# Patient Record
Sex: FEMALE | Race: WHITE | NOT HISPANIC OR LATINO | Employment: UNEMPLOYED | URBAN - NONMETROPOLITAN AREA
[De-identification: names, ages, dates, MRNs, and addresses within clinical notes are randomized per-mention and may not be internally consistent; named-entity substitution may affect disease eponyms.]

---

## 2020-07-22 LAB
EXTERNAL HIV CONFIRMATION: NORMAL
EXTERNAL HIV SCREEN: NORMAL

## 2024-11-01 ENCOUNTER — OFFICE VISIT (OUTPATIENT)
Dept: URGENT CARE | Facility: CLINIC | Age: 59
End: 2024-11-01
Payer: MEDICARE

## 2024-11-01 VITALS
SYSTOLIC BLOOD PRESSURE: 140 MMHG | WEIGHT: 155 LBS | HEART RATE: 82 BPM | OXYGEN SATURATION: 95 % | BODY MASS INDEX: 28.52 KG/M2 | DIASTOLIC BLOOD PRESSURE: 80 MMHG | HEIGHT: 62 IN | RESPIRATION RATE: 18 BRPM | TEMPERATURE: 99.8 F

## 2024-11-01 DIAGNOSIS — R39.9 UTI SYMPTOMS: Primary | ICD-10-CM

## 2024-11-01 DIAGNOSIS — R35.0 FREQUENCY OF MICTURITION: ICD-10-CM

## 2024-11-01 DIAGNOSIS — R11.2 NAUSEA VOMITING AND DIARRHEA: ICD-10-CM

## 2024-11-01 DIAGNOSIS — R51.9 ACUTE NONINTRACTABLE HEADACHE, UNSPECIFIED HEADACHE TYPE: ICD-10-CM

## 2024-11-01 DIAGNOSIS — R19.7 NAUSEA VOMITING AND DIARRHEA: ICD-10-CM

## 2024-11-01 PROCEDURE — G0463 HOSPITAL OUTPT CLINIC VISIT: HCPCS

## 2024-11-01 PROCEDURE — 96372 THER/PROPH/DIAG INJ SC/IM: CPT

## 2024-11-01 PROCEDURE — 87086 URINE CULTURE/COLONY COUNT: CPT

## 2024-11-01 PROCEDURE — 99213 OFFICE O/P EST LOW 20 MIN: CPT

## 2024-11-01 RX ORDER — GABAPENTIN 300 MG/1
300 CAPSULE ORAL 4 TIMES DAILY
COMMUNITY

## 2024-11-01 RX ORDER — CEPHALEXIN 500 MG/1
500 CAPSULE ORAL EVERY 12 HOURS SCHEDULED
Qty: 14 CAPSULE | Refills: 0 | Status: SHIPPED | OUTPATIENT
Start: 2024-11-01 | End: 2024-11-08

## 2024-11-01 RX ORDER — PROCHLORPERAZINE 25 MG
25 SUPPOSITORY, RECTAL RECTAL EVERY 12 HOURS PRN
Qty: 12 SUPPOSITORY | Refills: 0 | Status: SHIPPED | OUTPATIENT
Start: 2024-11-01

## 2024-11-01 RX ORDER — LISINOPRIL 5 MG/1
5 TABLET ORAL DAILY
COMMUNITY

## 2024-11-01 RX ORDER — SUVOREXANT 20 MG/1
1 TABLET, FILM COATED ORAL
COMMUNITY
Start: 2024-10-07

## 2024-11-01 RX ORDER — RIZATRIPTAN BENZOATE 10 MG/1
1 TABLET ORAL DAILY
COMMUNITY
Start: 2024-09-27

## 2024-11-01 RX ORDER — MONTELUKAST SODIUM 10 MG/1
10 TABLET ORAL
COMMUNITY

## 2024-11-01 RX ORDER — CLONAZEPAM 1 MG/1
1 TABLET ORAL 3 TIMES DAILY PRN
COMMUNITY

## 2024-11-01 RX ORDER — KETOROLAC TROMETHAMINE 30 MG/ML
30 INJECTION, SOLUTION INTRAMUSCULAR; INTRAVENOUS ONCE
Status: COMPLETED | OUTPATIENT
Start: 2024-11-01 | End: 2024-11-01

## 2024-11-01 RX ADMIN — KETOROLAC TROMETHAMINE 30 MG: 30 INJECTION, SOLUTION INTRAMUSCULAR; INTRAVENOUS at 19:22

## 2024-11-01 NOTE — PATIENT INSTRUCTIONS
Take Keflex as prescribed  Use suppository as needed for nausea and vomiting  Wait at least 6 hours before taking any other OTC medications for headache  Plenty of fluids and rest  Take Multivitamin or Magnesium 400 mg and Vitamin B2 400 mg  Can take tylenol/ibuprofen/excedrin migraine for headache  Avoid screens and bright lights   Go to ER if worsening headache, slurred speech, fainting, vision changes, or other worrisome symptoms   Follow up with PCP in 3-5 days.  Proceed to  ER if symptoms worsen.    If tests are performed, our office will contact you with results only if changes need to made to the care plan discussed with you at the visit. You can review your full results on [facility].

## 2024-11-01 NOTE — PROGRESS NOTES
[facility]        NAME: Zaida Almendarez is a 59 y.o. female  : 1965    MRN: [id number]  DATE: 2024  TIME: 7:29 PM    Assessment and Plan   UTI symptoms [R39.9]  1. UTI symptoms  cephalexin (KEFLEX) 500 mg capsule      2. Frequency of micturition  Urine culture    Urine culture    CANCELED: POCT urine HCG      3. Nausea vomiting and diarrhea  prochlorperazine (COMPAZINE) 25 mg suppository      4. Acute nonintractable headache, unspecified headache type  ketorolac (TORADOL) injection 30 mg        Patient states that IM Toradol sometimes helps breaks her migraines so we will do in office.  Patient stated she took Azo prior to coming this morning so unable to do UA.  Will prophylactically treat and send for urine culture.  For nausea and vomiting patient requested suppository medication.  Went over signs and symptoms of when to proceed ER.  Gave information for family practice to follow-up with.  Patient verbalized understanding.    Patient Instructions     Take Keflex as prescribed  Use suppository as needed for nausea and vomiting  Wait at least 6 hours before taking any other OTC medications for headache  Plenty of fluids and rest  Take Multivitamin or Magnesium 400 mg and Vitamin B2 400 mg  Can take tylenol/ibuprofen/excedrin migraine for headache  Avoid screens and bright lights   Go to ER if worsening headache, slurred speech, fainting, vision changes, or other worrisome symptoms   Follow up with PCP in 3-5 days.  Proceed to  ER if symptoms worsen.    If tests are performed, our office will contact you with results only if changes need to made to the care plan discussed with you at the visit. [facility]    Chief Complaint     Chief Complaint   Patient presents with    Possible UTI     C/O Frequency and burning upon urination associated with frequency and right flank pain which radiates forward into abdomen. Onset 3 weeks ago.     Migraine     " C/o head pain, over right side of head and also over frontal area of skull. States \"It is nonstop\" for 2 months. Takes Maxalt for same but  not working.         History of Present Illness       Patient is presenting for frequency and burning with urination x 3 weeks.  She also endorses some right flank pain that radiates to her abdomen.  She endorses nausea and a couple episodes of vomiting and diarrhea.  Denies any fevers.    Patient is also presenting for headache over the right side of her head continuously x 2 months.  She stated that she does normally take Maxalt for migraines.  She denies any dizziness, lightheadedness, vision changes, photophobia, or photophobia.    Migraine  Pertinent negatives include no abdominal pain, coughing, diarrhea, dizziness, fever, nausea, photophobia or vomiting.   Urinary Tract Infection   This is a new problem. Episode onset: 3 weeks. Associated symptoms include flank pain (R side) and frequency. Pertinent negatives include no chills, hematuria, nausea, urgency or vomiting.       Review of Systems   Review of Systems   Constitutional:  Negative for activity change, appetite change, chills and fever.   Eyes:  Negative for photophobia and visual disturbance.   Respiratory:  Negative for cough, shortness of breath and wheezing.    Cardiovascular:  Negative for chest pain.   Gastrointestinal:  Negative for abdominal pain, constipation, diarrhea, nausea and vomiting.   Genitourinary:  Positive for dysuria, flank pain (R side) and frequency. Negative for difficulty urinating, hematuria and urgency.   Neurological:  Positive for headaches. Negative for dizziness and light-headedness.         Current Medications       Current Outpatient Medications:     Belsomra 20 MG TABS, Take 1 tablet by mouth daily at bedtime, Disp: , Rfl:     cephalexin (KEFLEX) 500 mg capsule, Take 1 capsule (500 mg total) by mouth every 12 (twelve) hours for 7 days, Disp: 14 capsule, Rfl: 0    Cetirizine HCl 10 " "MG CAPS, Take 5 mg by mouth in the morning, Disp: , Rfl:     clonazePAM (KlonoPIN) 1 mg tablet, Take 1 mg by mouth 3 (three) times a day as needed for anxiety, Disp: , Rfl:     gabapentin (NEURONTIN) 300 mg capsule, Take 300 mg by mouth 4 (four) times a day, Disp: , Rfl:     lisinopril (ZESTRIL) 5 mg tablet, Take 5 mg by mouth daily, Disp: , Rfl:     montelukast (SINGULAIR) 10 mg tablet, Take 10 mg by mouth daily at bedtime, Disp: , Rfl:     prochlorperazine (COMPAZINE) 25 mg suppository, Insert 1 suppository (25 mg total) into the rectum every 12 (twelve) hours as needed for nausea or vomiting, Disp: 12 suppository, Rfl: 0    rizatriptan (MAXALT) 10 mg tablet, Take 1 tablet by mouth in the morning, Disp: , Rfl:   No current facility-administered medications for this visit.    Current Allergies     Allergies as of 11/01/2024 - Reviewed 11/01/2024   Allergen Reaction Noted    Aspirin GI Intolerance 10/16/2017    Celecoxib Other (See Comments) 11/09/2017    Codeine Itching 06/11/2013    Morphine Itching 10/16/2017    Nsaids GI Intolerance 06/11/2013    Rofecoxib Other (See Comments) 05/29/2020            The following portions of the patient's history were reviewed and updated as appropriate: allergies, current medications, past family history, past medical history, past social history, past surgical history and problem list.     Past Medical History:   Diagnosis Date    ADHD (attention deficit hyperactivity disorder)     Allergic     History of lumbar laminectomy     Migraine     Neuropathy     PTSD (post-traumatic stress disorder)        Past Surgical History:   Procedure Laterality Date    BACK SURGERY      FRACTURE SURGERY      SINUS SURGERY         No family history on file.      Medications have been verified.        Objective   /80   Pulse 82   Temp 99.8 °F (37.7 °C)   Resp 18   Ht 5' 2\" (1.575 m)   Wt 70.3 kg (155 lb)   SpO2 95%   BMI 28.35 kg/m²        Physical Exam     Physical " Exam  Constitutional:       General: She is not in acute distress.     Appearance: Normal appearance. She is not ill-appearing.   HENT:      Head: Normocephalic.   Eyes:      Extraocular Movements: Extraocular movements intact.      Conjunctiva/sclera: Conjunctivae normal.      Pupils: Pupils are equal, round, and reactive to light.   Cardiovascular:      Rate and Rhythm: Normal rate and regular rhythm.      Pulses: Normal pulses.      Heart sounds: Normal heart sounds.   Pulmonary:      Effort: Pulmonary effort is normal.      Breath sounds: Normal breath sounds.   Abdominal:      General: Abdomen is flat. Bowel sounds are normal. There is no distension.      Palpations: Abdomen is soft.      Tenderness: There is no abdominal tenderness. There is no right CVA tenderness, left CVA tenderness, guarding or rebound.   Lymphadenopathy:      Cervical: No cervical adenopathy.   Skin:     General: Skin is warm and dry.   Neurological:      General: No focal deficit present.      Mental Status: She is alert and oriented to person, place, and time. Mental status is at baseline.   Psychiatric:         Mood and Affect: Mood normal.         Behavior: Behavior normal.         Thought Content: Thought content normal.         Judgment: Judgment normal.

## 2024-11-03 LAB — BACTERIA UR CULT: NORMAL

## 2024-12-02 ENCOUNTER — OFFICE VISIT (OUTPATIENT)
Dept: URGENT CARE | Facility: CLINIC | Age: 59
End: 2024-12-02
Payer: COMMERCIAL

## 2024-12-02 VITALS
HEART RATE: 110 BPM | SYSTOLIC BLOOD PRESSURE: 184 MMHG | RESPIRATION RATE: 16 BRPM | TEMPERATURE: 99.2 F | DIASTOLIC BLOOD PRESSURE: 82 MMHG | OXYGEN SATURATION: 98 %

## 2024-12-02 DIAGNOSIS — R11.0 NAUSEA: ICD-10-CM

## 2024-12-02 DIAGNOSIS — T78.40XA ALLERGY, INITIAL ENCOUNTER: Primary | ICD-10-CM

## 2024-12-02 DIAGNOSIS — R52 ACUTE GENERALIZED BODY PAIN: ICD-10-CM

## 2024-12-02 PROCEDURE — 96372 THER/PROPH/DIAG INJ SC/IM: CPT

## 2024-12-02 PROCEDURE — 99213 OFFICE O/P EST LOW 20 MIN: CPT

## 2024-12-02 RX ORDER — METHYLPREDNISOLONE SODIUM SUCCINATE 125 MG/2ML
125 INJECTION, POWDER, LYOPHILIZED, FOR SOLUTION INTRAMUSCULAR; INTRAVENOUS ONCE
Status: COMPLETED | OUTPATIENT
Start: 2024-12-02 | End: 2024-12-02

## 2024-12-02 RX ORDER — HYDROXYZINE HYDROCHLORIDE 50 MG/1
50 TABLET, FILM COATED ORAL 3 TIMES DAILY PRN
Qty: 30 TABLET | Refills: 0 | Status: SHIPPED | OUTPATIENT
Start: 2024-12-02

## 2024-12-02 RX ORDER — PROCHLORPERAZINE 25 MG
25 SUPPOSITORY, RECTAL RECTAL EVERY 12 HOURS PRN
Qty: 12 SUPPOSITORY | Refills: 0 | Status: SHIPPED | OUTPATIENT
Start: 2024-12-02

## 2024-12-02 RX ORDER — ALBUTEROL SULFATE 90 UG/1
2 INHALANT RESPIRATORY (INHALATION) EVERY 6 HOURS PRN
Qty: 8.5 G | Refills: 0 | Status: SHIPPED | OUTPATIENT
Start: 2024-12-02

## 2024-12-02 RX ADMIN — METHYLPREDNISOLONE SODIUM SUCCINATE 125 MG: 125 INJECTION, POWDER, LYOPHILIZED, FOR SOLUTION INTRAMUSCULAR; INTRAVENOUS at 18:00

## 2024-12-02 NOTE — PATIENT INSTRUCTIONS
Use inhaler for shortness of breath or wheezing  Continue OTC and previously prescribed medication for pain   Follow up with PCP for ongoing symptoms   Follow up with pain management for continued body pain   Follow up with PCP in 3-5 days.  Proceed to  ER if symptoms worsen.    If tests are performed, our office will contact you with results only if changes need to made to the care plan discussed with you at the visit. You can review your full results on [facility].

## 2024-12-02 NOTE — PROGRESS NOTES
"  [facility]        NAME: Zaida Almendarez is a 59 y.o. female  : 1965    MRN: [id number]  DATE: 2024  TIME: 8:26 PM    Assessment and Plan   Allergy, initial encounter [T78.40XA]  1. Allergy, initial encounter  methylPREDNISolone sodium succinate (Solu-MEDROL) injection 125 mg    albuterol (ProAir HFA) 90 mcg/act inhaler    hydrOXYzine HCL (ATARAX) 50 mg tablet      2. Acute generalized body pain  Ambulatory referral to Spine & Pain Management      3. Nausea  prochlorperazine (COMPAZINE) 25 mg suppository        Patient not having any signs of anaphylactic allergic reaction.  Patient resting comfortably with vital signs stable.     Patient stated she would trial hydroxyzine to help with itching and allergy symptoms.  She also requested Compazine suppositories to help with her nausea until she sees her PCP next month.  Due to her generalized body pain that has been ongoing, also put in referral for pain management for follow-up.  Went over signs and symptoms of when to proceed ER.  Also gave information to follow-up with PCP.    Patient Instructions     Use inhaler for shortness of breath or wheezing  Continue OTC and previously prescribed medication for pain   Follow up with PCP for ongoing symptoms   Follow up with pain management for continued body pain   Follow up with PCP in 3-5 days.  Proceed to  ER if symptoms worsen.    If tests are performed, our office will contact you with results only if changes need to made to the care plan discussed with you at the visit. [facility]    Chief Complaint     Chief Complaint   Patient presents with    Allergic Reaction     Has been having allergic reaction \"really bad\" from the nephews girlfriends dog.         History of Present Illness       Patient is presenting for \"allergic reaction\" x 4 days.  She states she has a history to a lot of things and family brought their dogs around 4 days ago which is " "flaring her allergies.  She is afraid of getting \"lung damage\"from being around the dog. Stated she is barricading herself in her bedroom.  She endorses shortness of breath, losing her voice, sinus congestion, headaches, and tremors.  She stated she is not responding well to her daily medications.  She states she also was not sleeping well.    Allergic Reaction  This is a new problem. Associated with: dog. Associated symptoms include eye itching. Pertinent negatives include no chest pain, coughing, trouble swallowing or wheezing.       Review of Systems   Review of Systems   Constitutional:  Negative for chills, diaphoresis, fatigue and fever.   HENT:  Positive for congestion and voice change. Negative for ear pain, postnasal drip, rhinorrhea, sinus pressure, sore throat and trouble swallowing.    Eyes:  Positive for itching.   Respiratory:  Positive for shortness of breath. Negative for cough, chest tightness and wheezing.    Cardiovascular:  Negative for chest pain and palpitations.   Gastrointestinal:  Positive for nausea.   Musculoskeletal:  Positive for myalgias.   Skin:  Negative for color change.   Neurological:  Negative for dizziness and light-headedness.   Psychiatric/Behavioral:  Negative for sleep disturbance.          Current Medications       Current Outpatient Medications:     albuterol (ProAir HFA) 90 mcg/act inhaler, Inhale 2 puffs every 6 (six) hours as needed for wheezing or shortness of breath, Disp: 8.5 g, Rfl: 0    Belsomra 20 MG TABS, Take 1 tablet by mouth daily at bedtime, Disp: , Rfl:     Cetirizine HCl 10 MG CAPS, Take 5 mg by mouth in the morning, Disp: , Rfl:     clonazePAM (KlonoPIN) 1 mg tablet, Take 1 mg by mouth 3 (three) times a day as needed for anxiety, Disp: , Rfl:     gabapentin (NEURONTIN) 300 mg capsule, Take 300 mg by mouth 4 (four) times a day, Disp: , Rfl:     hydrOXYzine HCL (ATARAX) 50 mg tablet, Take 1 tablet (50 mg total) by mouth 3 (three) times a day as needed for " itching, anxiety or allergies, Disp: 30 tablet, Rfl: 0    lisinopril (ZESTRIL) 5 mg tablet, Take 5 mg by mouth daily, Disp: , Rfl:     montelukast (SINGULAIR) 10 mg tablet, Take 10 mg by mouth daily at bedtime, Disp: , Rfl:     prochlorperazine (COMPAZINE) 25 mg suppository, Insert 1 suppository (25 mg total) into the rectum every 12 (twelve) hours as needed for nausea or vomiting, Disp: 12 suppository, Rfl: 0    prochlorperazine (COMPAZINE) 25 mg suppository, Insert 1 suppository (25 mg total) into the rectum every 12 (twelve) hours as needed for nausea or vomiting, Disp: 12 suppository, Rfl: 0    rizatriptan (MAXALT) 10 mg tablet, Take 1 tablet by mouth in the morning (Patient not taking: Reported on 12/2/2024), Disp: , Rfl:   No current facility-administered medications for this visit.    Current Allergies     Allergies as of 12/02/2024 - Reviewed 12/02/2024   Allergen Reaction Noted    Aspirin GI Intolerance 10/16/2017    Celecoxib Other (See Comments) 11/09/2017    Codeine Itching 06/11/2013    Morphine Itching 10/16/2017    Nsaids GI Intolerance 06/11/2013    Rofecoxib Other (See Comments) 05/29/2020            The following portions of the patient's history were reviewed and updated as appropriate: allergies, current medications, past family history, past medical history, past social history, past surgical history and problem list.     Past Medical History:   Diagnosis Date    ADHD (attention deficit hyperactivity disorder)     Allergic     History of lumbar laminectomy     Migraine     Neuropathy     PTSD (post-traumatic stress disorder)        Past Surgical History:   Procedure Laterality Date    BACK SURGERY      FRACTURE SURGERY      SINUS SURGERY         History reviewed. No pertinent family history.      Medications have been verified.        Objective   BP (!) 184/82   Pulse (!) 110   Temp 99.2 °F (37.3 °C)   Resp 16   SpO2 98%        Physical Exam     Physical Exam  Constitutional:       General:  She is not in acute distress.     Appearance: Normal appearance. She is not ill-appearing.      Comments: Talking in complete sentences without any difficulty   HENT:      Head: Normocephalic.      Nose: No congestion.      Mouth/Throat:      Mouth: Mucous membranes are moist.      Pharynx: Oropharynx is clear. No oropharyngeal exudate or posterior oropharyngeal erythema.      Comments: No throat, lip, or oropharynx swelling  Cardiovascular:      Rate and Rhythm: Normal rate and regular rhythm.      Pulses: Normal pulses.      Heart sounds: Normal heart sounds.   Pulmonary:      Effort: Pulmonary effort is normal. No respiratory distress.      Breath sounds: Normal breath sounds. No stridor. No wheezing, rhonchi or rales.   Lymphadenopathy:      Cervical: No cervical adenopathy.   Skin:     General: Skin is warm and dry.      Findings: No rash (No rash or redness on body).   Neurological:      General: No focal deficit present.      Mental Status: She is alert and oriented to person, place, and time. Mental status is at baseline.   Psychiatric:         Mood and Affect: Mood normal.         Behavior: Behavior normal.         Thought Content: Thought content normal.         Judgment: Judgment normal.

## 2025-03-17 ENCOUNTER — TELEPHONE (OUTPATIENT)
Age: 60
End: 2025-03-17

## 2025-03-17 NOTE — TELEPHONE ENCOUNTER
Patient called, is new to the area, has appointment to establish care - PCP ([facility])  Pt has several medical concerns, is on medication, due to disability does not drive - is kindly asking if [facility] can arrange transportation services?    Pt stated she already called her insurance, [place], did not have luck with transportation and the local [facility] transportation system, due to her disability, is concerned with using their services.      Patients Name: Zaida Almendarez  : 1965  Phone Number: [phone]  Appointment Date: 3/21/2025  Appointment time: 10:40am   Address: [address] [facility] [address] [facility] [address]  [facility]  Drop off  Address: [address], [place]  Cost Center: n/a  Special notes/directions for  - pt lives in a duplex  Note for scheduling team    Pt is kindly requesting a return call, any assistance to help with transportation is appreciated.  Patients c/b# [phone]

## 2025-03-18 NOTE — TELEPHONE ENCOUNTER
Tried to reach patient. No answer and no voicemail box setup. Will give patient phone number to [facility] services [phone] when patient call back

## 2025-03-20 NOTE — TELEPHONE ENCOUNTER
Pt called in for her lyft arrangement details did relay the message from chart. She did not agree with it keep saying someone mentioned lyft will be arranged. Kelly transferred the call to practice was answered by Diana daev took the call to assist Thanks

## 2025-03-21 ENCOUNTER — TELEPHONE (OUTPATIENT)
Dept: ADMINISTRATIVE | Facility: OTHER | Age: 60
End: 2025-03-21

## 2025-03-21 NOTE — TELEPHONE ENCOUNTER
Upon review of the In Basket request we were able to locate, review, and update the patient chart as requested for HIV.    Any additional questions or concerns should be emailed to the Practice Liaisons via the appropriate education email address, please do not reply via In Basket.    Thank you  Vaishali Arriaga   [facility]

## 2025-03-21 NOTE — TELEPHONE ENCOUNTER
Pt called to see about transportation options to get to her appt, I warm transferred call to Karla on the clinical line.

## 2025-03-21 NOTE — TELEPHONE ENCOUNTER
----- Message from Diana APPLE sent at 3/21/2025  7:59 AM EDT -----  Regarding: Care Gap Request  03/21/25 7:59 AM    Hello, our patient No patient name on file. has had HIV completed/performed. Please assist in updating the patient chart by pulling the document from LAB Tab within Chart Review. The date of service is 07/22/2020.     Thank you,  Diana Moran MA  [facility] PRIMARY [facility]

## 2025-03-21 NOTE — TELEPHONE ENCOUNTER
Spoke with patient, contacted her insurance directly to see if they offer transportation services. While in the verification process it was also confirmed by insurance that we are not a participating provider. Patient has a [address] medicare advantage plan.

## 2025-03-21 NOTE — TELEPHONE ENCOUNTER
03/21/25 8:40 AM     Chart reviewed for HIV was/were submitted to the patient's insurance.     Vaishali Arriaga   [facility]

## 2025-03-31 ENCOUNTER — OFFICE VISIT (OUTPATIENT)
Dept: URGENT CARE | Facility: CLINIC | Age: 60
End: 2025-03-31
Payer: COMMERCIAL

## 2025-03-31 VITALS
OXYGEN SATURATION: 96 % | DIASTOLIC BLOOD PRESSURE: 90 MMHG | TEMPERATURE: 99.5 F | HEART RATE: 96 BPM | HEIGHT: 62 IN | RESPIRATION RATE: 18 BRPM | WEIGHT: 155 LBS | BODY MASS INDEX: 28.52 KG/M2 | SYSTOLIC BLOOD PRESSURE: 124 MMHG

## 2025-03-31 DIAGNOSIS — J01.40 ACUTE PANSINUSITIS, RECURRENCE NOT SPECIFIED: Primary | ICD-10-CM

## 2025-03-31 DIAGNOSIS — R60.0 BILATERAL LOWER EXTREMITY EDEMA: ICD-10-CM

## 2025-03-31 PROCEDURE — 99213 OFFICE O/P EST LOW 20 MIN: CPT

## 2025-03-31 RX ORDER — PREDNISONE 20 MG/1
40 TABLET ORAL DAILY
Qty: 10 TABLET | Refills: 0 | Status: SHIPPED | OUTPATIENT
Start: 2025-03-31 | End: 2025-04-05

## 2025-03-31 NOTE — PATIENT INSTRUCTIONS
Take antibiotics as prescribed, being sure to complete full course of therapy even if you feel better!    Eat yogurt with live and active cultures and/or take a probiotic at least 3 hours before or after antibiotic dose. Monitor stool for diarrhea and/or blood. If this occurs, contact primary care doctor ASAP.       Take course of steroids as prescribed. Be sure to take with food! Recommended to take in the morning, as taking this medication later in the day may cause sleep disturbance (keeping you awake). If diabetic, be sure to monitor your blood glucose levels while on this medication and notify PCP if levels become too elevated.  Avoid taking ibuprofen containing medications while on steroid therapy.     Tylenol Sinus over the counter  Warm compresses over sinuses  Steam treatment (practice proper safety precautions when handing hot liquids/steam)  Over the counter saline nasal spray     Take your daily medications as previously prescribed.  Tylenol for management of pain/fever.  May apply compression stockings to bilateral lower extremities to assist with edema.     Monitor for worsening symptoms.  Should you develop difficulty breathing, chest pain, worsening swelling/pain, or difficulty ambulating, proceed to the ED for further evaluation.    Follow up with PCP in 3-5 days.  Proceed to  ER if symptoms worsen.    If tests have been performed at [facility], our office will contact you with results if changes need to be made to the care plan discussed with you at the visit.  You can review your full results on [facility].

## 2025-03-31 NOTE — PROGRESS NOTES
[facility]        NAME: Zaida Almendarez is a 59 y.o. female  : 1965    MRN: [id number]  DATE: 2025  TIME: 12:34 PM    Assessment and Plan   Acute pansinusitis, recurrence not specified [J01.40]  1. Acute pansinusitis, recurrence not specified  amoxicillin-clavulanate (AUGMENTIN) 875-125 mg per tablet    predniSONE 20 mg tablet      2. Bilateral lower extremity edema  Ambulatory Referral to Family Practice        Plan to treat with course of oral antibiotics at this time based on physical exam.  Will also treat with course of oral steroid at this time for management of inflammation/pain.  Patient is repeatedly asking for injections of medications during her visit today however none of which are clinically indicated at this time.  Patient does express concern that she is having difficulty establishing care with a PCP.  Referral placed to family practice at this time to assist patient with establishing care for follow-up for her chronic conditions including the leg pain and pitting edema which she brings up during today's visit.  ED precautions discussed with patient.  Encouraged patient to take her daily medications as previously prescribed.  Tylenol for pain/fever. Increased fluids & rest. May continue with other OTC and supportive therapies at home. Patient in agreement with plan & verbalized understanding.       Patient Instructions   Take antibiotics as prescribed, being sure to complete full course of therapy even if you feel better!    Eat yogurt with live and active cultures and/or take a probiotic at least 3 hours before or after antibiotic dose. Monitor stool for diarrhea and/or blood. If this occurs, contact primary care doctor ASAP.       Take course of steroids as prescribed. Be sure to take with food! Recommended to take in the morning, as taking this medication later in the day may cause sleep disturbance (keeping you awake). If diabetic, be sure to monitor your blood glucose  levels while on this medication and notify PCP if levels become too elevated.  Avoid taking ibuprofen containing medications while on steroid therapy.     Tylenol Sinus over the counter  Warm compresses over sinuses  Steam treatment (practice proper safety precautions when handing hot liquids/steam)  Over the counter saline nasal spray     Take your daily medications as previously prescribed.  Tylenol for management of pain/fever.  May apply compression stockings to bilateral lower extremities to assist with edema.     Monitor for worsening symptoms.  Should you develop difficulty breathing, chest pain, worsening swelling/pain, or difficulty ambulating, proceed to the ED for further evaluation.    Follow up with PCP in 3-5 days.  Proceed to  ER if symptoms worsen.    If tests have been performed at [facility], our office will contact you with results if changes need to be made to the care plan discussed with you at the visit.  You can review your full results on [facility] MyChart.    Chief Complaint     Chief Complaint   Patient presents with    Leg Swelling     Both lower legs and feet swollen and very painful X 6 days    Cold Like Symptoms     Head congestion X 6 weeks         History of Present Illness       Patient is a 59-year-old female who presents today for evaluation of cold-like symptoms and bilateral leg pain.  She reports that her cold-like symptoms have been ongoing x 6 weeks.  She reports that she also has bilateral lower extremity swelling and pain at baseline however it has seemed to be progressively getting worse x 1 week.  She reports that the pain in her legs is currently rated as 10 out of 10 and sharp/burning in nature.  She reports also having intermittent numbness and tingling in bilateral lower extremities at baseline.  She reports that she has not followed with a PCP recently, as her insurance has recently changed and she is trying to reestablish care with a new provider.  She states that she  has not been taking her daily medications as prescribed, and therefore has not taken anything for her current pain.    URI   This is a new problem. The current episode started more than 1 month ago (x6 weeks). The problem has been gradually worsening. There has been no fever. Associated symptoms include congestion, coughing (Productive), headaches, joint pain, rhinorrhea and sinus pain. Pertinent negatives include no abdominal pain, chest pain, diarrhea, ear pain, nausea, plugged ear sensation, rash, sore throat or vomiting. Treatments tried: Mucinex DM. The treatment provided mild relief.       Review of Systems   Review of Systems   Constitutional:  Positive for chills and diaphoresis. Negative for activity change, appetite change, fatigue and fever.   HENT:  Positive for congestion, rhinorrhea, sinus pressure and sinus pain. Negative for ear pain, sore throat and trouble swallowing.    Respiratory:  Positive for cough (Productive). Negative for chest tightness and shortness of breath.    Cardiovascular:  Positive for leg swelling (bilateral). Negative for chest pain and palpitations.   Gastrointestinal:  Negative for abdominal pain, diarrhea, nausea and vomiting.   Musculoskeletal:  Positive for joint pain and myalgias. Negative for back pain, gait problem and joint swelling.   Skin:  Negative for color change, pallor, rash and wound.   Neurological:  Positive for numbness (at baseline) and headaches. Negative for dizziness, weakness and light-headedness.   Hematological:  Negative for adenopathy.   All other systems reviewed and are negative.        Current Medications       Current Outpatient Medications:     albuterol (ProAir HFA) 90 mcg/act inhaler, Inhale 2 puffs every 6 (six) hours as needed for wheezing or shortness of breath, Disp: 8.5 g, Rfl: 0    amLODIPine (NORVASC) 5 mg tablet, , Disp: , Rfl:     amoxicillin-clavulanate (AUGMENTIN) 875-125 mg per tablet, Take 1 tablet by mouth every 12 (twelve) hours  for 7 days, Disp: 14 tablet, Rfl: 0    clonazePAM (KlonoPIN) 1 mg tablet, Take 1 mg by mouth 3 (three) times a day as needed for anxiety, Disp: , Rfl:     lisinopril (ZESTRIL) 5 mg tablet, Take 5 mg by mouth daily, Disp: , Rfl:     montelukast (SINGULAIR) 10 mg tablet, Take 10 mg by mouth daily at bedtime, Disp: , Rfl:     pilocarpine (SALAGEN) 5 mg tablet, Take 5 mg by mouth 3 (three) times a day, Disp: , Rfl:     pramipexole (MIRAPEX) 0.25 mg tablet, Take 0.25 mg by mouth Three times a day, Disp: , Rfl:     predniSONE 20 mg tablet, Take 2 tablets (40 mg total) by mouth daily for 5 days, Disp: 10 tablet, Rfl: 0    prochlorperazine (COMPAZINE) 25 mg suppository, Insert 1 suppository (25 mg total) into the rectum every 12 (twelve) hours as needed for nausea or vomiting, Disp: 12 suppository, Rfl: 0    prochlorperazine (COMPAZINE) 25 mg suppository, Insert 1 suppository (25 mg total) into the rectum every 12 (twelve) hours as needed for nausea or vomiting, Disp: 12 suppository, Rfl: 0    amphetamine-dextroamphetamine (ADDERALL) 30 MG tablet, Take 1 tablet by mouth 2 (two) times a day (Patient not taking: Reported on 3/31/2025), Disp: , Rfl:     Belsomra 20 MG TABS, Take 1 tablet by mouth daily at bedtime (Patient not taking: Reported on 3/31/2025), Disp: , Rfl:     celecoxib (CeleBREX) 200 mg capsule, Take 1 capsule by mouth in the morning (Patient not taking: Reported on 3/31/2025), Disp: , Rfl:     Cetirizine HCl 10 MG CAPS, Take 5 mg by mouth in the morning (Patient not taking: Reported on 3/31/2025), Disp: , Rfl:     esomeprazole (NexIUM) 40 MG capsule, Take 1 capsule by mouth in the morning (Patient not taking: Reported on 3/31/2025), Disp: , Rfl:     gabapentin (NEURONTIN) 300 mg capsule, Take 300 mg by mouth 4 (four) times a day (Patient not taking: Reported on 3/31/2025), Disp: , Rfl:     hydrOXYzine HCL (ATARAX) 50 mg tablet, Take 1 tablet (50 mg total) by mouth 3 (three) times a day as needed for itching,  "anxiety or allergies (Patient not taking: Reported on 3/31/2025), Disp: 30 tablet, Rfl: 0    pantoprazole (PROTONIX) 20 mg tablet, Take 20 mg by mouth (Patient not taking: Reported on 3/31/2025), Disp: , Rfl:     rizatriptan (MAXALT) 10 mg tablet, Take 1 tablet by mouth in the morning (Patient not taking: Reported on 12/2/2024), Disp: , Rfl:     Current Allergies     Allergies as of 03/31/2025 - Reviewed 03/31/2025   Allergen Reaction Noted    Aspirin GI Intolerance 10/16/2017    Celecoxib Other (See Comments) 11/09/2017    Codeine Itching 06/11/2013    Milk (cow) Abdominal Pain 04/29/2024    Morphine Itching 10/16/2017    Nsaids GI Intolerance 06/11/2013    Rofecoxib Other (See Comments) 05/29/2020            The following portions of the patient's history were reviewed and updated as appropriate: allergies, current medications, past family history, past medical history, past social history, past surgical history and problem list.     Past Medical History:   Diagnosis Date    ADHD (attention deficit hyperactivity disorder)     Allergic     History of lumbar laminectomy     Migraine     Neuropathy     PTSD (post-traumatic stress disorder)        Past Surgical History:   Procedure Laterality Date    BACK SURGERY      FRACTURE SURGERY      SINUS SURGERY         History reviewed. No pertinent family history.      Medications have been verified.        Objective   /90   Pulse 96   Temp 99.5 °F (37.5 °C)   Resp 18   Ht 5' 2\" (1.575 m)   Wt 70.3 kg (155 lb)   SpO2 96%   BMI 28.35 kg/m²   No LMP recorded. Patient is postmenopausal.       Physical Exam     Physical Exam  Vitals and nursing note reviewed.   Constitutional:       General: She is not in acute distress.     Appearance: Normal appearance. She is not ill-appearing, toxic-appearing or diaphoretic.      Comments: Patient is extremely difficult to redirect.  She is not ill appearing.   HENT:      Head: Normocephalic and atraumatic.      Right Ear: " Tympanic membrane, ear canal and external ear normal.      Left Ear: Tympanic membrane, ear canal and external ear normal.      Nose: Congestion present. No rhinorrhea.      Right Sinus: Maxillary sinus tenderness and frontal sinus tenderness present.      Left Sinus: Maxillary sinus tenderness and frontal sinus tenderness present.      Mouth/Throat:      Lips: Pink. No lesions.      Mouth: Mucous membranes are moist.      Pharynx: Oropharynx is clear. Uvula midline. Posterior oropharyngeal erythema (mild) present. No pharyngeal swelling, oropharyngeal exudate or uvula swelling.      Tonsils: No tonsillar exudate.   Eyes:      Extraocular Movements: Extraocular movements intact.      Conjunctiva/sclera: Conjunctivae normal.      Pupils: Pupils are equal, round, and reactive to light.   Cardiovascular:      Rate and Rhythm: Normal rate and regular rhythm.      Pulses: Normal pulses.      Heart sounds: Normal heart sounds.   Pulmonary:      Effort: Pulmonary effort is normal. No respiratory distress.      Breath sounds: Normal breath sounds. No stridor. No wheezing, rhonchi or rales.   Chest:      Chest wall: No tenderness.   Musculoskeletal:         General: Tenderness present. No deformity or signs of injury. Normal range of motion.      Cervical back: Normal range of motion and neck supple. No tenderness.      Right upper leg: Normal.      Left upper leg: Normal.      Right knee: Normal.      Left knee: Normal.      Right lower leg: Tenderness present. No deformity, lacerations or bony tenderness. 2+ Pitting Edema present.      Left lower leg: Tenderness present. No deformity, lacerations or bony tenderness. 2+ Pitting Edema present.      Right ankle: Swelling present. No deformity or ecchymosis. Tenderness present. Normal range of motion.      Left ankle: Swelling present. No deformity or ecchymosis. Tenderness present. Normal range of motion.      Right foot: Normal range of motion. Swelling and tenderness  present.      Left foot: Normal range of motion. Swelling and tenderness present.      Comments: Patient has 2+ pitting edema in bilateral lower extremities including bilateral ankles & feet. There are no visible deformities. There is no bruising, wounds/abrasions, or otherwise skin abnormalities noted on exam. She has generalized tenderness throughout bilateral lower extremities.    Lymphadenopathy:      Cervical: No cervical adenopathy.   Skin:     General: Skin is warm and dry.      Capillary Refill: Capillary refill takes less than 2 seconds.      Coloration: Skin is not jaundiced or pale.      Findings: No bruising, erythema, lesion or rash.   Neurological:      General: No focal deficit present.      Mental Status: She is alert. Mental status is at baseline.      Sensory: No sensory deficit.      Motor: No weakness.      Gait: Gait abnormal.   Psychiatric:         Mood and Affect: Mood normal.         Speech: Speech is tangential.         Behavior: Behavior normal.         Thought Content: Thought content normal.         Judgment: Judgment normal.      Comments: